# Patient Record
Sex: MALE | URBAN - METROPOLITAN AREA
[De-identification: names, ages, dates, MRNs, and addresses within clinical notes are randomized per-mention and may not be internally consistent; named-entity substitution may affect disease eponyms.]

---

## 2022-08-29 ENCOUNTER — HOSPITAL ENCOUNTER (OUTPATIENT)
Dept: GENERAL RADIOLOGY | Age: 37
Discharge: HOME | End: 2022-08-31

## 2022-08-29 ENCOUNTER — HOSPITAL ENCOUNTER (OUTPATIENT)
Age: 37
Discharge: HOME | End: 2022-08-29

## 2022-08-29 DIAGNOSIS — Z00.00 PHYSICAL EXAM: ICD-10-CM

## 2022-08-29 PROCEDURE — 71045 X-RAY EXAM CHEST 1 VIEW: CPT

## 2025-05-20 ENCOUNTER — OFFICE VISIT (OUTPATIENT)
Dept: ORTHOPEDIC SURGERY | Age: 40
End: 2025-05-20
Payer: COMMERCIAL

## 2025-05-20 DIAGNOSIS — M24.572 EQUINUS CONTRACTURE OF LEFT ANKLE: Primary | ICD-10-CM

## 2025-05-20 DIAGNOSIS — R52 PAIN: ICD-10-CM

## 2025-05-20 PROCEDURE — 99203 OFFICE O/P NEW LOW 30 MIN: CPT | Performed by: ORTHOPAEDIC SURGERY

## 2025-05-20 NOTE — PROGRESS NOTES
[facility]  [address]  [place] [facility] [address]  Dept Phone: [phone]  Dept Fax: [phone]      Orthopaedic Trauma New Patient      CHIEF COMPLAINT:    Chief Complaint   Patient presents with    New Patient     Left foot pain x1 month       HISTORY OF PRESENT ILLNESS:    The patient is a 40 y.o. male who is being seen as a new patient for evaluation of left foot pain.  He states he is not exactly clear when the symptoms began but he thinks was approximately 3 to 4 weeks ago.  He did a foot marathon approximately 2 years ago and had a lot of issues with IT band syndrome and has not been running heavily since.  Has done some intermittent running.  States the pain is on the dorsum of the foot.  He has resorted to biking for exercise to avoid further irritation.  He has concerns for potential stress fracture and wanted an evaluation so he is not making things worse.  Denies any mechanism of injury that he can recall.  Does not have pain in the other foot.  States his Achilles has always been very tight on this leg.  Has sprained his ankles during sports in the past but no other significant injuries to this foot.      Past Medical History:    History reviewed. No pertinent past medical history.    Past Surgical History:    History reviewed. No pertinent surgical history.    Current Medications:   No current outpatient medications on file.     No current facility-administered medications for this visit.       Allergies:    Patient has no known allergies.    Social History:   Social History     Socioeconomic History    Marital status: Unknown     Spouse name: Not on file    Number of children: Not on file    Years of education: Not on file    Highest education level: Not on file   Occupational History    Not on file   Tobacco Use    Smoking status: Not on file    Smokeless tobacco: Not on file   Substance and Sexual Activity    Alcohol use: Not on file    Drug use: Not on file

## 2025-06-03 ENCOUNTER — HOSPITAL ENCOUNTER (OUTPATIENT)
Dept: PHYSICAL THERAPY | Facility: CLINIC | Age: 40
Setting detail: THERAPIES SERIES
Discharge: HOME | End: 2025-06-03
Attending: ORTHOPAEDIC SURGERY
Payer: COMMERCIAL

## 2025-06-03 PROCEDURE — 97161 PT EVAL LOW COMPLEX 20 MIN: CPT

## 2025-06-03 PROCEDURE — 97110 THERAPEUTIC EXERCISES: CPT

## 2025-06-03 NOTE — CONSULTS
[] [facility]  Outpatient Rehabilitation &  Therapy  [address]  [phone]  F: [phone] [] [facility]  Outpatient Rehabilitation &  Therapy  [address]   [address]  P: [phone]  F: [phone] [x] [facility]  Outpatient Rehabilitation &  Therapy  [address]  P: [phone]  F: [phone] [] [facility]  Outpatient Rehabilitation &  Therapy  [address]  P: [phone]  F: [phone] [] [facility]  Outpatient Rehabilitation &  Therapy  [address]   P: [phone]  F: [phone]  [] [facility]  Outpatient Rehabilitation &  Therapy  [address]   P: [phone]  F: [phone] [] [facility]  Outpatient Rehabilitation &  Therapy  [address]  [address]  P: [phone]  F: [phone] [] [facility]  Outpatient Rehabilitation &  Therapy  [address]  P: [phone]  F: [phone] [] [facility]  Outpatient Rehabilitation &  Therapy  [address]  P: [phone]  F: [phone]      Physical Therapy Lower Extremity Evaluation    Date:  6/3/2025  Patient: Ginger Garcia  : 1985  MRN: [id number]  Physician: James Vora DO    Insurance: R BS; James yr; 30/30vs; auth after 30vs; 11% coins; 1060/0 met ded; 2000/0 met OOP   Medical Diagnosis: Left gastroc equinus contracture   Rehab Codes: M79.672  Onset date: 5/3/25  Next 's appt.: PRN    Subjective:   CC: Mr. Garcia, a 40-year-old male, was referred with the diagnosis  of left gastroc equinus contracture. One month ago had an onset of left foot pain and tightness. He denies any injury.  However, he is a runner. He states he has a history of IT-band problems on the left and  states his left side generally feels stiff including his hip. He states he was concerned he had

## 2025-06-10 ENCOUNTER — HOSPITAL ENCOUNTER (OUTPATIENT)
Dept: PHYSICAL THERAPY | Facility: CLINIC | Age: 40
Setting detail: THERAPIES SERIES
Discharge: HOME | End: 2025-06-10
Attending: ORTHOPAEDIC SURGERY
Payer: COMMERCIAL

## 2025-06-10 PROCEDURE — 97110 THERAPEUTIC EXERCISES: CPT

## 2025-06-10 PROCEDURE — 97140 MANUAL THERAPY 1/> REGIONS: CPT

## 2025-06-10 NOTE — FLOWSHEET NOTE
[] [facility]  Outpatient Rehabilitation &  Therapy  [address]  [phone]  [phone] [] [facility]  Outpatient Rehabilitation &  Therapy  [address]  P: [phone]  F: [phone] [x] [facility]  Outpatient Rehabilitation &  Therapy  [address]  P: [phone]  F: [phone] [] [facility]  Outpatient Rehabilitation &  Therapy  [address]  [phone]  [phone] [] [facility]  Outpatient Rehabilitation &  Therapy  [address]   P: [phone]  F: [phone]  [] [facility]  Outpatient Rehabilitation &  Therapy  [address]  P: [phone]  F: [phone] [] [facility]  Outpatient Rehabilitation &  Therapy  [address]  P: [phone]  F: [phone] [] [facility]  Outpatient Rehabilitation &  Therapy  [address]  P: [phone]  F: [phone] [] [facility]  Outpatient Rehabilitation &  Therapy  [address]  P: [phone]  F: [phone]  [] [facility] Outpatient Rehabilitation &  Therapy  [address]  P: [phone]  F: [phone]     Physical Therapy Daily Treatment Note    Date:  6/10/2025  Patient Name:  Ginger Garcia    :  1985  MRN: [id number]  Physician: James Vora DO                                 Insurance: R [facility]; James yr; 30/30vs; auth after 30vs; 11% coins; 1060/0 met ded; 2000/0 met OOP   Medical Diagnosis: Left gastroc equinus contracture                      Rehab Codes: M79.672  Onset date: 5/3/25                 Next 's appt.: PRN  Visit# / total visits: ;      Cancels/No Shows: 0    Subjective:    Pain:  [x] Yes  [] No Location: dorsal lateal foot  Pain Rating: (0-10 scale) 1/10  Pain altered Tx:  [] No  [] Yes

## 2025-06-10 NOTE — CARE COORDINATION
[] [facility]  Outpatient Rehabilitation &  Therapy  [address]  [phone]  [phone] [] [facility]  Outpatient Rehabilitation &  Therapy  [address]  P: [phone]  F: [phone] [x] [facility]  Outpatient Rehabilitation &  Therapy  [address]  P: [phone]  F: [phone] [] [facility]  Outpatient Rehabilitation &  Therapy  [address]  [phone]  [phone] [] [facility]  Outpatient Rehabilitation &  Therapy  [address]   P: [phone]  F: [phone]  [] [facility]  Outpatient Rehabilitation &  Therapy  [address]  P: [phone]  F: [phone] [] [facility]  Outpatient Rehabilitation &  Therapy  [address]  P: [phone]  F: [phone] [] [facility]  Outpatient Rehabilitation &  Therapy  [address]  P: [phone]  F: [phone] [] [facility]  Outpatient Rehabilitation &  Therapy  [address]  P: [phone]  F: [phone]  [] [facility] Outpatient Rehabilitation &  Therapy  [address]  P: [phone]  F: [phone]     THERAPY RESPONSIBILITY OF CARE TRANSFER FORM       PATIENT NAME: Ginger Garcia  MRN: [id number]   : 1985      TRANSFERRING FACILITY:    [x] [place]   [] [facility]   [] [place]   [] [facility]   [] [facility] [] [place]   [] [facility]  [] [place] [facility]  [] [facility]   [] [place]  [] [place]   [] Other:          ACCEPTING FACILITY  [x] [place]   [] [facility]   [] [place]   [] [facility]   [] [facility] [] [place]   [] [place] [facility]  [x] [facility]  []  [facility]   [] [place]  [] [place]   [] Other:         REASON FOR TRANSFER: Runner      TRANSFER OF

## 2025-06-17 ENCOUNTER — HOSPITAL ENCOUNTER (OUTPATIENT)
Dept: PHYSICAL THERAPY | Facility: CLINIC | Age: 40
Setting detail: THERAPIES SERIES
Discharge: HOME | End: 2025-06-17
Attending: ORTHOPAEDIC SURGERY
Payer: COMMERCIAL

## 2025-06-17 PROCEDURE — 97110 THERAPEUTIC EXERCISES: CPT

## 2025-06-17 PROCEDURE — 97140 MANUAL THERAPY 1/> REGIONS: CPT

## 2025-06-17 NOTE — FLOWSHEET NOTE
[x] [facility]  Outpatient Rehabilitation &  Therapy  [address]  P: [phone]  F: [phone] [x] [facility]  Outpatient Rehabilitation &  Therapy  [address]  [phone]  [phone]     Physical Therapy Daily Treatment Note    Date:  2025  Patient Name:  Ginger Garcia    :  1985  MRN: [id number]  Physician: James Vora DO               Insurance: R [facility]; James yr; 30/30vs; auth after 30vs; 11% coins; 1060/0 met ded; /0 met OOP   Medical Diagnosis: Left gastroc equinus contracture                       Rehab Codes: M79.672  Onset date: 5/3/25                   Next 's appt.: PRN  Visit# / total visits: 3/12   Cancels/No Shows: 0    Subjective:    Pain:  [x] Yes  [] No Location: dorsal lateral L foot  Pain Rating: (0-10 scale) 1/10  Pain altered Tx:  [x] No  [] Yes  Action:  Comments:in 2nd half of , had L thigh pain.  Did stretching.   As mileage increased, he noted increased pain.  In , on and off of running.   In , started to get back into running.    L lateral foot pain.   L lateral foot and arch pain.     Also picked up cycling.    The more flexible the foot is, the better he feels.   PMH: many ankle sprains.   NB 860s from [facility] and was previously in [facility] w/ a rocker bottom    Objective:  Modalities: none  Precautions [x] No  [] Yes:   Exercises:  Exercise Reps/ Time Weight/ Level Completed Comments   Supine       1 legged bridges       Sidlelying       Anais hip abd       Gym       [facility]       -functional reach       BAPS       Other:  Manual  STM to L calf and PF in supine  Assess strength of hips  Specific Instructions for next treatment:   1.  Perform        Treatment Charges: Mins Units Time In/Out   []  Modalities        [x]  Ther Exercise 27 2 [id number]   []  Neuromuscular Re-ed      []  Gait Training      [x]  Manual Therapy 23 9 [phone]   []  Ther Activities      []  Aquatics      []

## 2025-06-25 ENCOUNTER — HOSPITAL ENCOUNTER (OUTPATIENT)
Dept: PHYSICAL THERAPY | Facility: CLINIC | Age: 40
Setting detail: THERAPIES SERIES
Discharge: HOME | End: 2025-06-25
Attending: ORTHOPAEDIC SURGERY
Payer: COMMERCIAL

## 2025-06-25 PROCEDURE — 97112 NEUROMUSCULAR REEDUCATION: CPT

## 2025-06-25 PROCEDURE — 97750 PHYSICAL PERFORMANCE TEST: CPT

## 2025-06-25 NOTE — FLOWSHEET NOTE
[x] [facility]  Outpatient Rehabilitation &  Therapy  [address]  P: [phone]  F: [phone] [x] [facility]  Outpatient Rehabilitation &  Therapy  [address]  [phone]  [phone]     Physical Therapy Daily Treatment Note    Date:  2025  Patient Name:  Ginger Garcia    :  1985  MRN: [id number]  Physician: James Vora DO               Insurance: [employer] BS; James yr; 30/30vs; auth after 30vs; 11% coins; 1060/0 met ded; /0 met OOP   Medical Diagnosis: Left gastroc equinus contracture                       Rehab Codes: M79.672  Onset date: 5/3/25                   Next 's appt.: PRN  Visit# / total visits:    Cancels/No Shows: 0    Subjective:    Pain:  [x] Yes  [] No Location: dorsal lateral L foot  Pain Rating: (0-10 scale) 1/10  Pain altered Tx:  [x] No  [] Yes  Action:  Comments:no issues with HEP.. Was able to run with a carrie at 155 but at an increased effort.  Feels like he may be more on his forefoot when running      Objective:  Modalities: none  Precautions [x] No  [] Yes:   Exercises:  Exercise Reps/ Time Weight/ Level Completed Comments   Supine       1 legged bridges       Sidlelying       Anais hip abd       Gym       MOBO       -rocks       -functional reach       BAPS       Other:  Video Run Analysis   Warm up:   Pace: 12  min/mile   Duration: 5 minutes    Shoes:    Carrie: 146 spm    Frontal plane deviations:    Increased hip adduction/crossover on L ONLY        Sagittal plane deviations:     near full knee extension at initial contact    Elevated foot ground angle at initial contact       Recommendations:     Increase carrie by 10% to 160 spm and issued return to run plan.      No crossover on L    Breath through the mouth.        Manual  STM to L calf and PF in supine    Specific Instructions for next treatment:   1.    Continue STM to calf   2.  Advance strengthening activities.  3.  Update return to run

## 2025-07-01 ENCOUNTER — HOSPITAL ENCOUNTER (OUTPATIENT)
Dept: PHYSICAL THERAPY | Facility: CLINIC | Age: 40
Setting detail: THERAPIES SERIES
Discharge: HOME | End: 2025-07-01
Attending: ORTHOPAEDIC SURGERY
Payer: COMMERCIAL

## 2025-07-01 PROCEDURE — 97110 THERAPEUTIC EXERCISES: CPT

## 2025-07-01 NOTE — FLOWSHEET NOTE
Written  Access Code: [email or web address]  URL: [email or web address]  Date: 07/01/2025  Prepared by: Randal Castañeda    Program Notes  Ginger Garcia  Return to run plan  3' walk/2' run x 6-8 cycles for 3 runs  2' walk/3' run x 6-8 cycles for 3 runs  1' walk/4' run x 6-8 cycles for 3 runs  walk pace: 3.0 mph  Run pace: 5.0 mph or an effort that is easy.   probably shouldn't be below 5.0 mph.  Every other day w/ occasional back to backbreathe through mouth.    Exercises  - Toe Yoga - Alternating Great Toe and Lesser Toe Extension  - 1 x daily - 5 x weekly - 3 sets - 10 reps  - Toe Spreading  - 1 x daily - 5 x weekly - 2 sets - 10 reps  - calf stretch w/ towel under inside 1/2 of foot  - 5 x daily - 1 sets - 3 reps - 30 second hold  - Single Leg Bridge  - 1 x daily - 5 x weekly - 2 sets - 10 reps  - Sidelying Hip Abduction at Wall  - 1 x daily - 5 x weekly - 3 sets - 10 reps  - Backward Step Down  - 1 x daily - 5 x weekly - 2 sets - 15 reps  - Lateral Step Down  - 1 x daily - 5 x weekly - 2 sets - 10 reps  - Forward Step Down  - 1 x daily - 5 x weekly - 2 sets - 10 reps  - Eccentric Heel Lowering on Step  - 1 x daily - 5 x weekly - 3 sets - 10 reps  - Side Stepping with Resistance at Feet  - 1 x daily - 5 x weekly - 4 sets - 20 reps    Comprehension of Education:  [x] Verbalizes understanding.  [x] Demonstrates understanding.  [x] Needs review.  [] Demonstrates/verbalizes HEP/Ed previously given.    Plan: [x] Continue current frequency toward long and short term goals.    [x] Specific Instructions for subsequent treatments: see above      Time In:1640  Time Out: 1736    Electronically signed by:  Randal Castañeda, PT

## 2025-07-08 ENCOUNTER — HOSPITAL ENCOUNTER (OUTPATIENT)
Dept: PHYSICAL THERAPY | Facility: CLINIC | Age: 40
Setting detail: THERAPIES SERIES
Discharge: HOME | End: 2025-07-08
Attending: ORTHOPAEDIC SURGERY
Payer: COMMERCIAL

## 2025-07-08 PROCEDURE — 97110 THERAPEUTIC EXERCISES: CPT

## 2025-07-08 NOTE — FLOWSHEET NOTE
[x] [facility]  Outpatient Rehabilitation &  Therapy  [address]  P: [phone]  F: [phone] [x] [facility]  Outpatient Rehabilitation &  Therapy  [address]  [phone]  [phone]     Physical Therapy Daily Treatment Note    Date:  2025  Patient Name:  Ginger Garcia    :  1985  MRN: [id number]  Physician: James Vora DO               Insurance: R [facility]; James yr; 30/30vs; auth after 30vs; 11% coins; 1060/0 met ded; 2000/0 met OOP   Medical Diagnosis: Left gastroc equinus contracture                       Rehab Codes: M79.672  Onset date: 5/3/25                   Next 's appt.: PRN  Visit# / total visits:    Cancels/No Shows: 0/0    Subjective:    Pain:  [x] Yes  [] No Location: dorsal lateral L foot  Pain Rating: (0-10 scale) 1/10  Pain altered Tx:  [x] No  [] Yes  Action:  Comments: was able to have his bike fit done at [employer] and the fitter made significant adjustments.     Objective:  Modalities: none  Precautions [x] No  [] Yes: avoid toe out position  Exercises:  Exercise Reps/ Time Weight/ Level Completed Comments   Supine       1 legged bridges       Sidlelying       Anais hip abd       Gym       Step downs 15 8\" X    Heel taps 15 4\" X    Monster walks 4x15' lime X Issued lime and blue   Calf stretch on SB 3x30\" L5 X    Eccentric calf raises 2x10 0 lbs X step   Split squats *      MOBO       -rocks 20 1/3, 2/4 X    -functional reach 10x 1/3, 2/4 X    -calf raises 2x10 1/4 X    BAPS 3x10 L2 X    Other:  Video Run Analysis   Warm up:   Pace: 12  min/mile   Duration: 5 minutes    Shoes:    Carrie: 146 spm    Frontal plane deviations:    Increased hip adduction/crossover on L ONLY        Sagittal plane deviations:     near full knee extension at initial contact    Elevated foot ground angle at initial contact       Recommendations:     Increase carrie by 10% to 160 spm and issued return to run plan.      No crossover

## 2025-07-22 ENCOUNTER — HOSPITAL ENCOUNTER (OUTPATIENT)
Dept: PHYSICAL THERAPY | Facility: CLINIC | Age: 40
Setting detail: THERAPIES SERIES
Discharge: HOME | End: 2025-07-22
Attending: ORTHOPAEDIC SURGERY
Payer: COMMERCIAL

## 2025-07-22 PROCEDURE — 97112 NEUROMUSCULAR REEDUCATION: CPT

## 2025-07-22 PROCEDURE — 97110 THERAPEUTIC EXERCISES: CPT

## 2025-07-22 NOTE — FLOWSHEET NOTE
[x] [facility]  Outpatient Rehabilitation &  Therapy  [address]  P: [phone]  F: [phone] [x] [facility]  Outpatient Rehabilitation &  Therapy  [address]  [phone]  [phone]     Physical Therapy Daily Treatment Note    Date:  2025  Patient Name:  Ginger Garcia    :  1985  MRN: [id number]  Physician: James Vora DO               Insurance: [facility]; James yr; 30/30vs; auth after 30vs; 11% coins; 1060/0 met ded; 2000/0 met OOP   Medical Diagnosis: L gastroc equinus contracture                       Rehab Codes: M79.672  Onset date: 5/3/25                   Next 's appt.: PRN  Visit# / total visits:    Cancels/No Shows: 0/0    Subjective:    Pain:  [x] Yes  [] No Location: dorsal lateral L foot  Pain Rating: (0-10 scale) 1/10  Pain altered Tx:  [x] No  [] Yes  Action:  Comments: ran 1'/2' walk for 30-40 min up to 10 min mile pace, so he has not been following the instructed in his HEP.     Objective:  Modalities: none  Precautions [x] No  [] Yes: avoid toe out position  Exercises:  Exercise Reps/ Time Weight/ Level Completed Comments   Supine       1 legged bridges       Sidlelying       Anais hip abd       Gym       Step downs 15 8\"     Heel taps 15 4\"     Monster walks 4x15' lime  Issued lime and blue   Calf stretch on SB 3x30\" L5     Eccentric calf raises 2x10 0 lbs  step   Split squats *      MOBO       -rocks 20 1/3, 2/4     -functional reach 10x 1/3, 2/4     -calf raises 2x10 1/4     BAPS 3x10 L2     Other:  Video Run Analysis   Warm up:   Pace: 12  min/mile   Duration: 5 minutes    Shoes:    Carrie: 146 spm    Frontal plane deviations:    Increased hip adduction/crossover on L ONLY        Sagittal plane deviations:     near full knee extension at initial contact    Elevated foot ground angle at initial contact       Recommendations:     Increase carrie by 10% to 160 spm and issued return to run plan.      No 
Not applicable

## 2025-08-12 ENCOUNTER — HOSPITAL ENCOUNTER (OUTPATIENT)
Dept: PHYSICAL THERAPY | Facility: CLINIC | Age: 40
Setting detail: THERAPIES SERIES
Discharge: HOME | End: 2025-08-12
Attending: ORTHOPAEDIC SURGERY
Payer: COMMERCIAL

## 2025-08-12 PROCEDURE — 97112 NEUROMUSCULAR REEDUCATION: CPT

## 2025-08-12 PROCEDURE — 97110 THERAPEUTIC EXERCISES: CPT

## 2025-08-27 ENCOUNTER — HOSPITAL ENCOUNTER (OUTPATIENT)
Dept: PHYSICAL THERAPY | Facility: CLINIC | Age: 40
Setting detail: THERAPIES SERIES
Discharge: HOME | End: 2025-08-27
Attending: ORTHOPAEDIC SURGERY
Payer: COMMERCIAL

## 2025-08-27 PROCEDURE — 97112 NEUROMUSCULAR REEDUCATION: CPT

## 2025-08-27 PROCEDURE — 97110 THERAPEUTIC EXERCISES: CPT
